# Patient Record
Sex: MALE | Race: WHITE | Employment: UNEMPLOYED | URBAN - METROPOLITAN AREA
[De-identification: names, ages, dates, MRNs, and addresses within clinical notes are randomized per-mention and may not be internally consistent; named-entity substitution may affect disease eponyms.]

---
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
## 2022-04-22 ENCOUNTER — OFFICE VISIT (OUTPATIENT)
Dept: FAMILY MEDICINE CLINIC | Age: 22
End: 2022-04-22
Payer: COMMERCIAL

## 2022-04-22 VITALS
SYSTOLIC BLOOD PRESSURE: 126 MMHG | DIASTOLIC BLOOD PRESSURE: 82 MMHG | BODY MASS INDEX: 29.15 KG/M2 | HEIGHT: 71 IN | TEMPERATURE: 98.2 F | OXYGEN SATURATION: 98 % | WEIGHT: 208.2 LBS | HEART RATE: 71 BPM

## 2022-04-22 DIAGNOSIS — E53.8 VITAMIN B 12 DEFICIENCY: ICD-10-CM

## 2022-04-22 DIAGNOSIS — F40.298 SPECIFIC PHOBIA: ICD-10-CM

## 2022-04-22 DIAGNOSIS — Z13.6 SCREENING FOR CARDIOVASCULAR CONDITION: ICD-10-CM

## 2022-04-22 DIAGNOSIS — Z00.01 ENCOUNTER FOR WELL ADULT EXAM WITH ABNORMAL FINDINGS: Primary | ICD-10-CM

## 2022-04-22 DIAGNOSIS — Z23 ENCOUNTER FOR IMMUNIZATION: ICD-10-CM

## 2022-04-22 DIAGNOSIS — E55.9 VITAMIN D DEFICIENCY: ICD-10-CM

## 2022-04-22 DIAGNOSIS — R53.82 CHRONIC FATIGUE: ICD-10-CM

## 2022-04-22 DIAGNOSIS — F84.0 AUTISM: ICD-10-CM

## 2022-04-22 PROCEDURE — 99395 PREV VISIT EST AGE 18-39: CPT | Performed by: FAMILY MEDICINE

## 2022-04-22 RX ORDER — ERGOCALCIFEROL 1.25 MG/1
50000 CAPSULE ORAL WEEKLY
Qty: 12 CAPSULE | Refills: 0 | Status: SHIPPED | OUTPATIENT
Start: 2022-04-22

## 2022-04-22 RX ORDER — FLUOXETINE 10 MG/1
10 CAPSULE ORAL DAILY
Qty: 30 CAPSULE | Refills: 3 | Status: SHIPPED | OUTPATIENT
Start: 2022-04-22

## 2022-04-22 RX ORDER — BNT162B2 0.23 MG/2.25ML
0.3 INJECTION, SUSPENSION INTRAMUSCULAR ONCE
Qty: 0.3 ML | Refills: 0 | Status: SHIPPED | OUTPATIENT
Start: 2022-04-22 | End: 2022-04-22

## 2022-04-22 ASSESSMENT — PATIENT HEALTH QUESTIONNAIRE - PHQ9
2. FEELING DOWN, DEPRESSED OR HOPELESS: 0
SUM OF ALL RESPONSES TO PHQ QUESTIONS 1-9: 0
SUM OF ALL RESPONSES TO PHQ QUESTIONS 1-9: 0
SUM OF ALL RESPONSES TO PHQ9 QUESTIONS 1 & 2: 0
1. LITTLE INTEREST OR PLEASURE IN DOING THINGS: 0
SUM OF ALL RESPONSES TO PHQ QUESTIONS 1-9: 0
SUM OF ALL RESPONSES TO PHQ QUESTIONS 1-9: 0

## 2022-04-22 ASSESSMENT — ENCOUNTER SYMPTOMS
ABDOMINAL PAIN: 0
CHEST TIGHTNESS: 0
VOMITING: 0
WHEEZING: 0
SHORTNESS OF BREATH: 0
COUGH: 0
CONSTIPATION: 0
NAUSEA: 0
BACK PAIN: 0
DIARRHEA: 0
ABDOMINAL DISTENTION: 0

## 2022-04-22 NOTE — PROGRESS NOTES
2022      Jeanette Owens (:  2000) is a 25 y.o. male, here for a preventive medicine evaluation, Annual Exam (Tired) and Anxiety (autism, phobias)   . ASSESSMENT/PLAN:    1. Encounter for well adult exam with abnormal findings  Low carb, low fat diet, increase fruits and vegetables, and exercise 4-5 times a week 30-40 minutes a day, or walk 1-2 hours per day, or wear a pedometer and get at least 10,000 steps per day. Dental exam 2-3 times /year advised. Immunizations reviewed. Health Maintenance reviewed   Smoking cessation counseling given, not to ever start smoking    Annual eye exam advised. Recheck labs  -     TSH; Future  -     CBC; Future  -     Comprehensive Metabolic Panel; Future  -     Lipid Panel; Future  2. Vitamin B 12 deficiency  Previously diagnosed with B12 deficiency, restart B12 supplementation recheck labs  -     Vitamin B12 & Folate; Future  -     cyanocobalamin ([facility] VITAMIN B12) 1000 MCG tablet; Take 1 tablet by mouth daily, Disp-90 tablet, R-3Normal  3. Vitamin D deficiency  Likely not resolved, vitamin D very low previously, recheck vitamin D and restart high doses vitamin D  -     Vitamin D 25 Hydroxy; Future  -     vitamin D (ERGOCALCIFEROL) 1.25 MG (23888 UT) CAPS capsule; Take 1 capsule by mouth once a week, Disp-12 capsule, R-0Normal  4. Specific phobia  Mostly seasonal, during spring, summer and fall, and when camping  -   To restart FLUoxetine (PROZAC) 10 MG capsule; Take 1 capsule by mouth daily, Disp-30 capsule, R-3Normal  -     TSH; Future    5. Autism with ADD  Keeping a job at [employer], praise given  He does not follow with psychiatrist or psychologist anymore. His mom says that he does not need it anymore. -     FLUoxetine (PROZAC) 10 MG capsule; Take 1 capsule by mouth daily, Disp-30 capsule, R-3Normal  -     TSH; Future  6. Chronic fatigue  Failing to resolve.   Will do basic labs to rule out certain common medical conditions: times daily as needed for Pain  Patient not taking: Reported on 4/22/2022  Tim Ortiz MD   vitamin D (ERGOCALCIFEROL) 1.25 MG (63860 UT) CAPS capsule Take 1 capsule by mouth once a week  Patient not taking: Reported on 4/22/2022  Tim Ortiz MD   cyanocobalamin ([facility] VITAMIN B12) 1000 MCG tablet Take 1 tablet by mouth daily  Patient not taking: Reported on 4/22/2022  Tim Ortiz MD        No Known Allergies    Past Medical History:   Diagnosis Date    Allergic disorder     Anxiety     Bilateral otitis media     Class 1 obesity due to excess calories without serious comorbidity in adult 4/18/2021    Cleft palate     Fetal valproate syndrome     H/O speech and language deficits     mild now    Hyperlipidemia with target LDL less than 100 4/19/2021    Pneumonia     Primary nocturnal enuresis        Past Surgical History:   Procedure Laterality Date    CLEFT PALATE REPAIR      TYMPANOSTOMY TUBE PLACEMENT         . Social History     Tobacco Use    Smoking status: Never Smoker    Smokeless tobacco: Never Used   Substance Use Topics    Alcohol use: No    Drug use: No       Family History   Problem Relation Age of Onset    Seizures Mother     Asthma Father     Prostate Cancer Maternal Grandfather 58    Diabetes Neg Hx     Heart Disease Neg Hx     Colon Cancer Neg Hx     Breast Cancer Neg Hx        ADVANCE DIRECTIVE: N, <no information>    SUBJECTIVE / Bonnie Scott is here for Annual Exam (Tired) and Anxiety (autism, phobias)     Comes with his mom, Ida Silva. His mom reports during spring, summer and even fall, patient develops specific phobias, needs to restart Prozac. Patient has autism, gives a job at [employer], praise given. He has phobias around birds and lakes, and usually they go camping which triggers his phobias. Stopped B12, vitamin D as he ran out. He has prior diagnosis of vitamin B12 and vitamin D deficiencies.   Patient says he is still tired    Janelle Brothers has Vitamin D deficiency. Janelle Brothers  is not taking Vitamin D supplementation , stopped it  he feels tired. Lab Results   Component Value Date    VITD25 <7.0 04/17/2021     Vitamin B12 deficiency   Janelle Brothers  is not taking Vitamin B12 supplementation. Jewel reports fatigue. Urinary symptoms: no    Sexually active: NO    Wears seatbelts: not driving, wears seatbelt, his mom is driving him everywhere. Regular exercise: yes, walking , his mom says, when working, he is on his feet 10 hrs at [facility]    Ever been transfused or tattooed?: no    Last eye exam: up to date: No  Abnormal visual screening. I advised Amadodemetrio Curry to make appointment with ophthalmologist. The patient verbalizes understanding and agrees with the plan. Visual Acuity Screening    Right eye Left eye Both eyes   Without correction: 20/13 20/13 20/15   With correction:          Hearing:normal :Yes    Last dental exam and preventative dental cleaning: up to date :NO    Nutritional assessment: Body mass index is 29.04 kg/m². Elevated. Weight is  Improved, he has lost on his own 17 pounds in 1 year. Patient says he is not drinking diet pop and not regular pop anymore. Wt Readings from Last 3 Encounters:   04/22/22 208 lb 3.2 oz (94.4 kg) (95 %, Z= 1.64)*   10/15/21 (!) 227 lb (103 kg) (98 %, Z= 2.05)*   04/15/21 (!) 225 lb (102.1 kg) (98 %, Z= 2.05)*     * Growth percentiles are based on CDC (Boys, 2-20 Years) data. Healthful diet and Physical activity counseling to prevent CVD- low carb, low fat diet, increase fruits and vegetables, and exercise 4-5 times a day 30-40minutes a day discussed    Nicotine dependence.  no    Counseling given: Yes      Alcohol use: no    Immunization History   Administered Date(s) Administered    COVID-19, Pfizer Purple top, DILUTE for use, 12+ yrs, 30mcg/0.3mL dose 04/27/2021, 05/18/2021    DTaP 02/06/2003, 04/03/2003, 06/12/2003, 07/22/2004, 06/18/2008    DTaP vaccine 02/06/2003, 04/03/2003, 06/12/2003, 07/22/2004, 06/18/2008    HPV 9-valent Wake Gabriela) 09/15/2016, 10/17/2016    HPV Quadrivalent (Gardasil) 08/27/2015    Hepatitis A 09/07/2006, 10/22/2007    Hepatitis A Ped/Adol (Havrix, Vaqta) 09/07/2006, 10/22/2007    Hepatitis B 2000, 02/20/2003, 06/12/2003    Hepatitis B Ped/Adol (Engerix-B, Recombivax HB) 2000, 02/20/2003, 06/12/2003    Hib PRP-OMP (PedvaxHIB) 02/20/2003, 05/06/2003, 06/12/2003, 07/22/2004    Hib, unspecified 02/20/2003, 05/06/2003, 06/12/2003, 07/22/2004    Influenza Virus Vaccine 11/30/2003    Influenza Whole 11/30/2003    Influenza, Quadv, IM, PF (6 mo and older Fluzone, Flulaval, Fluarix, and 3 yrs and older Afluria) 09/15/2016, 10/06/2020    MMR 07/01/2004, 06/18/2008    Meningococcal MCV4O (Menveo) 10/06/2020    Meningococcal MCV4P (Menactra) 07/14/2011, 04/06/2018    Pneumococcal Conjugate 7-valent (Prevnar7) 02/20/2003, 05/06/2003, 07/03/2003    Polio IPV (IPOL) 02/06/2003, 04/03/2003, 07/22/2004, 06/18/2008    Tdap (Boostrix, Adacel) 08/27/2015    Varicella (Varivax) 07/01/2004, 07/14/2011         COVID-19 vaccine: No: He is due for booster, will schedule      Tdap one time, then Td every 10 years-up to date:  Yes    Influenza annually-up to date:  No    PPSV 23 in all adults 19-63 yo with chronic conditions,smokers, alcoholism,  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     No results found for: PSA, PSADIA      Blood pressure is normal.  BP Readings from Last 3 Encounters:   04/22/22 126/82   10/15/21 116/82   04/15/21 134/86       Pulse is normal.  Pulse Readings from Last 3 Encounters:   04/22/22 71   10/15/21 63   04/15/21 75       Lipid screening -mild hyperlipidemia with increased LDL in the past.  Lab Results   Component Value Date    CHOL 199 04/17/2021     Lab Results   Component Value Date    TRIG 138 04/17/2021     Lab Results   Component Value Date    HDL 48 04/17/2021     Lab Results   Component Value Date    LDLCALC 123 04/17/2021     Lab Results   Component Value Date    CHOLHDLRATIO 4.1 04/17/2021       Cardiovascular risk is:  low    The ASCVD Risk score (Amy Fong, et al., 2013) failed to calculate for the following reasons: The 2013 ASCVD risk score is only valid for ages 36 to 78    Hepatitis C screening-  nonreactive    Phobias around birds and lakes   Autism. In the past she was on Prozac, to help him with anxiety and phobias. PHQ-2 Over the past 2 weeks, how often have you been bothered by any of the following problems? Little interest or pleasure in doing things: Not at all  Feeling down, depressed, or hopeless: Not at all  PHQ-2 Score: 0  PHQ-9 Over the past 2 weeks, how often have you been bothered by any of the following problems? PHQ-9 Total Score: 0  PHQ-9 Total Score: 0      PHQ Scores 4/22/2022 4/15/2021 9/8/2016 9/8/2016 9/8/2016 9/8/2016   PHQ2 Score 0 0 0 0 0 0   PHQ9 Score 0 0 0 0 0 0       Health Maintenance   Topic Date Due    COVID-19 Vaccine (3 - Booster for Pfizer series) 10/18/2021    Flu vaccine (Season Ended) 09/01/2022    Depression Screen  04/22/2023    DTaP/Tdap/Td vaccine (7 - Td or Tdap) 08/27/2025    Hepatitis A vaccine  Completed    Hepatitis B vaccine  Completed    Hib vaccine  Completed    HPV vaccine  Completed    Varicella vaccine  Completed    Meningococcal (ACWY) vaccine  Completed    Hepatitis C screen  Completed    HIV screen  Completed    Pneumococcal 0-64 years Vaccine  Aged Out       -rest of complaints with corresponding details per ROS    Review of Systems   Constitutional: Positive for fatigue. Negative for activity change, appetite change, chills, diaphoresis, fever and unexpected weight change. HENT: Negative for congestion, dental problem, ear pain and hearing loss. Eyes: Negative for visual disturbance. Respiratory: Negative for cough, chest tightness, shortness of breath and wheezing. Cardiovascular: Negative for chest pain, palpitations and leg swelling. Gastrointestinal: Negative for abdominal distention, abdominal pain, constipation, diarrhea, nausea and vomiting. Endocrine: Negative for cold intolerance, heat intolerance, polydipsia, polyphagia and polyuria. Genitourinary: Negative for decreased urine volume, difficulty urinating, frequency and urgency. Musculoskeletal: Negative for arthralgias and back pain. Skin: Negative for rash. Allergic/Immunologic: Negative for environmental allergies. Neurological: Negative for dizziness, weakness and headaches. Hematological: Does not bruise/bleed easily. Psychiatric/Behavioral: Negative for decreased concentration, dysphoric mood and sleep disturbance. The patient is nervous/anxious.          -vital signs stable and within normal limits except overweight per BMI    /82   Pulse 71   Temp 98.2 °F (36.8 °C)   Ht 5' 11\" (1.803 m)   Wt 208 lb 3.2 oz (94.4 kg)   SpO2 98%   BMI 29.04 kg/m²   Vitals:    04/22/22 1405   BP: 126/82   Pulse: 71   Temp: 98.2 °F (36.8 °C)   SpO2: 98%   Weight: 208 lb 3.2 oz (94.4 kg)   Height: 5' 11\" (1.803 m)     Estimated body mass index is 29.04 kg/m² as calculated from the following:    Height as of this encounter: 5' 11\" (1.803 m). Weight as of this encounter: 208 lb 3.2 oz (94.4 kg). Physical Exam  Vitals and nursing note reviewed. Constitutional:       General: He is not in acute distress. Appearance: Normal appearance. He is well-developed. He is not diaphoretic. HENT:      Head: Normocephalic and atraumatic. Right Ear: Hearing, ear canal and external ear normal. Tympanic membrane is perforated. Left Ear: Hearing, ear canal and external ear normal. A PE tube is present. Tympanic membrane is perforated.       Mouth/Throat:      Comments: I did not examine the mouth due to coronavirus pandemic and wearing masks. Eyes:      General: Lids are normal. No scleral icterus. Right eye: No discharge. Left eye: No discharge. Extraocular Movements: Extraocular movements intact. Conjunctiva/sclera: Conjunctivae normal.   Neck:      Thyroid: No thyromegaly. Cardiovascular:      Rate and Rhythm: Normal rate and regular rhythm. Heart sounds: Normal heart sounds. No murmur heard. Pulmonary:      Effort: Pulmonary effort is normal. No respiratory distress. Breath sounds: Normal breath sounds. No wheezing or rales. Chest:      Chest wall: No tenderness. Abdominal:      General: Bowel sounds are normal. There is no distension. Palpations: Abdomen is soft. There is no hepatomegaly or splenomegaly. Tenderness: There is no abdominal tenderness. Musculoskeletal:         General: No tenderness. Normal range of motion. Cervical back: Normal range of motion and neck supple. Right lower leg: No edema. Left lower leg: No edema. Skin:     General: Skin is warm and dry. Capillary Refill: Capillary refill takes less than 2 seconds. Findings: No rash. Neurological:      Mental Status: He is alert and oriented to person, place, and time. Cranial Nerves: No cranial nerve deficit. Motor: No abnormal muscle tone. Coordination: Coordination normal.      Gait: Gait normal.      Deep Tendon Reflexes: Reflexes normal.   Psychiatric:         Attention and Perception: Attention normal.         Mood and Affect: Mood is anxious. Affect is labile. Speech: Speech is rapid and pressured. Behavior: Behavior normal.         Thought Content:  Thought content normal.         Cognition and Memory: Cognition normal.         Judgment: Judgment normal.      Comments: Stuttering, poor eye contact, he did give me permission to bring his mom in         I personally reviewed testing with patient.   Vitamin D deficiency  Mild hyperlipidemia  Potassium low    Otherwise labs within normal limits      Lab Results   Component Value Date    WBC 4.8 04/17/2021    HGB 15.3 04/17/2021    HCT 44.6 04/17/2021    MCV 88 04/17/2021     04/17/2021       Lab Results   Component Value Date     04/17/2021    K 3.6 04/17/2021     04/17/2021    CO2 24 04/17/2021    BUN 13 04/17/2021    CREATININE 0.90 04/17/2021    GLUCOSE 94 04/17/2021    CALCIUM 8.9 04/17/2021        Lab Results   Component Value Date    ALT 13 04/17/2021    AST 15 04/17/2021    ALKPHOS 95 04/17/2021    BILITOT 0.5 04/17/2021       Lab Results   Component Value Date    TSH 1.88 04/17/2021       Lab Results   Component Value Date    LDLCALC 123 04/17/2021       No results found for: LABA1C    No results found for: ODEIHUHY70    No results found for: FOLATE    No results found for: IRON, TIBC, FERRITIN    Lab Results   Component Value Date    VITD25 <7.0 04/17/2021         Orders Placed This Encounter   Medications    COVID-19 mRNA Vac-Yun,Pfizer, (PFIZER-BIONT COVID-19 VAC-YUN) 30 MCG/0.3ML SUSP injection     Sig: Inject 0.3 mLs into the muscle once for 1 dose DUE FOR BOOSTER, PLEASE LET PT KNOW WHEN READY     Dispense:  0.3 mL     Refill:  0    FLUoxetine (PROZAC) 10 MG capsule     Sig: Take 1 capsule by mouth daily     Dispense:  30 capsule     Refill:  3    vitamin D (ERGOCALCIFEROL) 1.25 MG (58339 UT) CAPS capsule     Sig: Take 1 capsule by mouth once a week     Dispense:  12 capsule     Refill:  0    cyanocobalamin ([facility] VITAMIN B12) 1000 MCG tablet     Sig: Take 1 tablet by mouth daily     Dispense:  90 tablet     Refill:  3         Medications Discontinued During This Encounter   Medication Reason    vitamin D (ERGOCALCIFEROL) 1.25 MG (83472 UT) CAPS capsule REORDER    cyanocobalamin ([facility] VITAMIN B12) 1000 MCG tablet REORDER         Orders Placed This Encounter   Procedures    Vitamin B12 & Folate     Standing Status: Future     Standing Expiration Date:   6/19/2022    Vitamin D 25 Hydroxy     Standing Status:   Future     Standing Expiration Date:   4/22/2023    TSH     Standing Status:   Future     Standing Expiration Date:   4/22/2023    CBC     Standing Status:   Future     Standing Expiration Date:   4/22/2023    Comprehensive Metabolic Panel     Standing Status:   Future     Standing Expiration Date:   6/19/2022    Lipid Panel     Standing Status:   Future     Standing Expiration Date:   4/22/2023     Order Specific Question:   Is Patient Fasting?/# of Hours     Answer:   8-10 Hours, water ok to drink         This note was completed by using the assistance of a speech-recognition program. However, inadvertent computerized transcription errors may be present. Although every effort was made to ensure accuracy, no guarantees can be provided that every mistake has been identified and corrected by editing. An electronic signature was used to authenticate this note.     Electronically signed by Randy Ortega MD on 4/23/2022 at 9:22 AM

## 2022-04-22 NOTE — PATIENT INSTRUCTIONS

## 2022-04-22 NOTE — PROGRESS NOTES
Visit Information    Have you changed or started any medications since your last visit including any over-the-counter medicines, vitamins, or herbal medicines? no   Have you stopped taking any of your medications? Is so, why? -  yes - all of them  Are you having any side effects from any of your medications? - no    Have you seen any other physician or provider since your last visit?  no   Have you had any other diagnostic tests since your last visit?  no   Have you been seen in the emergency room and/or had an admission in a hospital since we last saw you?  no   Have you had your routine dental cleaning in the past 6 months?  no     Do you have an active MyChart account? If no, what is the barrier?   Yes - email sent    Patient Care Team:  Kalyn Dale MD as PCP - General (Family Medicine)  Kalyn Dale MD [facility]  Baldemar Blizzard, MD as Consulting Physician (Plastic Surgery)    Medical History Review  Past Medical, Family, and Social History reviewed and does contribute to the patient presenting condition    Health Maintenance   Topic Date Due    COVID-19 Vaccine (3 - Booster for Mcrae Peter series) 10/18/2021    Depression Screen  04/15/2022    Flu vaccine (Season Ended) 09/01/2022    DTaP/Tdap/Td vaccine (7 - Td or Tdap) 08/27/2025    Hepatitis A vaccine  Completed    Hepatitis B vaccine  Completed    Hib vaccine  Completed    HPV vaccine  Completed    Varicella vaccine  Completed    Meningococcal (ACWY) vaccine  Completed    Hepatitis C screen  Completed    HIV screen  Completed    Pneumococcal 0-64 years Vaccine  Aged Out

## 2023-04-26 ENCOUNTER — OFFICE VISIT (OUTPATIENT)
Dept: FAMILY MEDICINE CLINIC | Age: 23
End: 2023-04-26
Payer: COMMERCIAL

## 2023-04-26 VITALS
BODY MASS INDEX: 28.84 KG/M2 | TEMPERATURE: 97.6 F | WEIGHT: 206 LBS | SYSTOLIC BLOOD PRESSURE: 130 MMHG | HEIGHT: 71 IN | OXYGEN SATURATION: 99 % | HEART RATE: 86 BPM | DIASTOLIC BLOOD PRESSURE: 76 MMHG

## 2023-04-26 DIAGNOSIS — F84.0 AUTISM: ICD-10-CM

## 2023-04-26 DIAGNOSIS — E55.9 VITAMIN D DEFICIENCY: ICD-10-CM

## 2023-04-26 DIAGNOSIS — Z00.01 ENCOUNTER FOR WELL ADULT EXAM WITH ABNORMAL FINDINGS: Primary | ICD-10-CM

## 2023-04-26 PROCEDURE — 99395 PREV VISIT EST AGE 18-39: CPT | Performed by: FAMILY MEDICINE

## 2023-04-26 RX ORDER — ERGOCALCIFEROL 1.25 MG/1
50000 CAPSULE ORAL WEEKLY
Qty: 12 CAPSULE | Refills: 0 | Status: SHIPPED | OUTPATIENT
Start: 2023-04-26

## 2023-04-26 SDOH — ECONOMIC STABILITY: FOOD INSECURITY: WITHIN THE PAST 12 MONTHS, THE FOOD YOU BOUGHT JUST DIDN'T LAST AND YOU DIDN'T HAVE MONEY TO GET MORE.: NEVER TRUE

## 2023-04-26 SDOH — ECONOMIC STABILITY: FOOD INSECURITY: WITHIN THE PAST 12 MONTHS, YOU WORRIED THAT YOUR FOOD WOULD RUN OUT BEFORE YOU GOT MONEY TO BUY MORE.: NEVER TRUE

## 2023-04-26 SDOH — ECONOMIC STABILITY: INCOME INSECURITY: HOW HARD IS IT FOR YOU TO PAY FOR THE VERY BASICS LIKE FOOD, HOUSING, MEDICAL CARE, AND HEATING?: NOT HARD AT ALL

## 2023-04-26 SDOH — ECONOMIC STABILITY: HOUSING INSECURITY
IN THE LAST 12 MONTHS, WAS THERE A TIME WHEN YOU DID NOT HAVE A STEADY PLACE TO SLEEP OR SLEPT IN A SHELTER (INCLUDING NOW)?: NO

## 2023-04-26 ASSESSMENT — ENCOUNTER SYMPTOMS
CHEST TIGHTNESS: 0
BACK PAIN: 0
WHEEZING: 0
SHORTNESS OF BREATH: 0
NAUSEA: 0
ABDOMINAL DISTENTION: 0
CONSTIPATION: 0
VOMITING: 0
DIARRHEA: 0
COUGH: 0
ABDOMINAL PAIN: 0

## 2023-04-26 ASSESSMENT — PATIENT HEALTH QUESTIONNAIRE - PHQ9
1. LITTLE INTEREST OR PLEASURE IN DOING THINGS: 0
SUM OF ALL RESPONSES TO PHQ QUESTIONS 1-9: 0
SUM OF ALL RESPONSES TO PHQ9 QUESTIONS 1 & 2: 0
2. FEELING DOWN, DEPRESSED OR HOPELESS: 0
SUM OF ALL RESPONSES TO PHQ QUESTIONS 1-9: 0

## 2023-04-26 NOTE — PROGRESS NOTES
Visit Information    Have you changed or started any medications since your last visit including any over-the-counter medicines, vitamins, or herbal medicines? YES  Have you stopped taking any of your medications? Is so, why? -  yes -   Are you having any side effects from any of your medications? - no    Have you seen any other physician or provider since your last visit?  no   Have you had any other diagnostic tests since your last visit?  no   Have you been seen in the emergency room and/or had an admission in a hospital since we last saw you?  no   Have you had your routine dental cleaning in the past 6 months?  no     Do you have an active MyChart account? If no, what is the barrier?   Yes    Patient Care Team:  Primitivo Torres MD as PCP - General (Family Medicine)  Primitivo Torres MD as PCP - Empaneled Provider  Yesenia Diamond MD as Consulting Physician (Plastic Surgery)    Medical History Review  Past Medical, Family, and Social History reviewed and does contribute to the patient presenting condition    Health Maintenance   Topic Date Due    COVID-19 Vaccine (3 - Booster for Mcrae Peter series) 07/13/2021    Depression Screen  04/22/2023    Flu vaccine (Season Ended) 08/01/2023    DTaP/Tdap/Td vaccine (7 - Td or Tdap) 08/27/2025    Hepatitis A vaccine  Completed    Hib vaccine  Completed    HPV vaccine  Completed    Varicella vaccine  Completed    Meningococcal (ACWY) vaccine  Completed    Hepatitis C screen  Completed    HIV screen  Completed    Pneumococcal 0-64 years Vaccine  Aged Out    Hepatitis B vaccine  Discontinued    Polio vaccine  Discontinued    Measles,Mumps,Rubella (MMR) vaccine  Discontinued

## 2023-04-26 NOTE — PROGRESS NOTES
2023      Donald Koenig (:  2000) is a 23 y.o. male, here for a preventive medicine evaluation, Annual Exam (NO CONCERNS), Discuss Labs, and Discuss Medications (STATED HAS NOT TAKEN MEDICATION IN A LONG TIME)   . ASSESSMENT/PLAN:    1. Encounter for well adult exam with abnormal findings      Low carb, low fat diet, increase fruits and vegetables, and exercise 4-5 times a week 30-40 minutes a day, or walk 1-2 hours per day, or wear a pedometer and get at least 10,000 steps per day. Dental exam 2-3 times /year advised. Needs to schedule appointment, needs to start brushing his teeth twice a day, and to floss  Needs to stop drinking pop to protect his teeth and to improve his health    Immunizations reviewed. Health Maintenance reviewed   Smoking cessation counseling given, not to ever start smoking      Annual eye exam advised. 2. Autism with ADD  Stable  We will continue to monitor  He has specific phobias around birds and legs, he self discontinued Prozac 10 Mg daily, he says he does not need it    3. Vitamin D deficiency  Likely worsening  He ran out of the vitamin D and does not take any supplementation  -     vitamin D (ERGOCALCIFEROL) 1.25 MG (95987 UT) CAPS capsule; Take 1 capsule by mouth once a week, Disp-12 capsule, R-0Normal        Jewel received counseling on the following healthy behaviors: nutrition, exercise, and medication adherence and weight loss. Reviewed prior labs and health maintenance  Discussed use, benefit, and side effects of prescribed medications. Barriers to medication compliance addressed. Patient given educational materials - see patient instructions  All patient questions answered. Patient voiced understanding. The patient's past medical, surgical, social, and family history as well as his  current medications and allergies were reviewed as documented in today's encounter.       Medications, labs, diagnostic studies, consultations and

## 2023-08-02 NOTE — DISCHARGE SUMMARY
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 No further therapy indicated at this time. [] Patient demonstrated improvement from conditions with    /    goals met  [] Patient to continue exercises/home instructions independently. [] Therapy interrupted due to:  [] Patient has completed their prescribed number of treatment sessions. [x] Parents did not respond to our calls to schedule more therapy. _X_Other: Pt had numerous no call/no show and did not return call attempts. Discharge due to lack of attendance letter sent home.     Progress during therapy:  [x]  Patient demonstrated improved level of function  [] Patient declined in level of function secondary to:  [] No Change    Additional Comments:  RECOMMENDATIONS:  _X_ Discharge from [address] [place] [address]   __Discharge from OT  __Discharge from PT  __Other:    If you have any questions regarding this patients care please contact us at [phone]   Thank You for this referral.     Electronically signed by: Shasha Garcia M.A., [address]      Date:1/23/2019 Medication history is complete. Medication list updated in Outpatient Medication Record (OMR); reviewed with patient at bedside and verified with outpatient pharmacy ([facility]).

## 2024-05-01 ENCOUNTER — OFFICE VISIT (OUTPATIENT)
Dept: FAMILY MEDICINE CLINIC | Age: 24
End: 2024-05-01
Payer: COMMERCIAL

## 2024-05-01 VITALS
SYSTOLIC BLOOD PRESSURE: 110 MMHG | DIASTOLIC BLOOD PRESSURE: 80 MMHG | BODY MASS INDEX: 26.96 KG/M2 | HEIGHT: 73 IN | HEART RATE: 72 BPM | TEMPERATURE: 97.2 F | OXYGEN SATURATION: 99 % | WEIGHT: 203.4 LBS

## 2024-05-01 DIAGNOSIS — Z00.01 ENCOUNTER FOR WELL ADULT EXAM WITH ABNORMAL FINDINGS: Primary | ICD-10-CM

## 2024-05-01 DIAGNOSIS — E55.9 VITAMIN D DEFICIENCY: ICD-10-CM

## 2024-05-01 PROCEDURE — 99395 PREV VISIT EST AGE 18-39: CPT | Performed by: FAMILY MEDICINE

## 2024-05-01 RX ORDER — ERGOCALCIFEROL 1.25 MG/1
50000 CAPSULE ORAL WEEKLY
Qty: 12 CAPSULE | Refills: 0 | Status: SHIPPED | OUTPATIENT
Start: 2024-05-01

## 2024-05-01 SDOH — ECONOMIC STABILITY: FOOD INSECURITY: WITHIN THE PAST 12 MONTHS, YOU WORRIED THAT YOUR FOOD WOULD RUN OUT BEFORE YOU GOT MONEY TO BUY MORE.: NEVER TRUE

## 2024-05-01 SDOH — ECONOMIC STABILITY: FOOD INSECURITY: WITHIN THE PAST 12 MONTHS, THE FOOD YOU BOUGHT JUST DIDN'T LAST AND YOU DIDN'T HAVE MONEY TO GET MORE.: NEVER TRUE

## 2024-05-01 SDOH — ECONOMIC STABILITY: INCOME INSECURITY: HOW HARD IS IT FOR YOU TO PAY FOR THE VERY BASICS LIKE FOOD, HOUSING, MEDICAL CARE, AND HEATING?: NOT HARD AT ALL

## 2024-05-01 ASSESSMENT — ENCOUNTER SYMPTOMS
WHEEZING: 0
ABDOMINAL DISTENTION: 0
VOMITING: 0
CHEST TIGHTNESS: 0
BACK PAIN: 0
ABDOMINAL PAIN: 0
CONSTIPATION: 0
COUGH: 0
DIARRHEA: 0
SHORTNESS OF BREATH: 0
NAUSEA: 0

## 2024-05-01 ASSESSMENT — PATIENT HEALTH QUESTIONNAIRE - PHQ9
SUM OF ALL RESPONSES TO PHQ QUESTIONS 1-9: 0
2. FEELING DOWN, DEPRESSED OR HOPELESS: NOT AT ALL
SUM OF ALL RESPONSES TO PHQ9 QUESTIONS 1 & 2: 0
1. LITTLE INTEREST OR PLEASURE IN DOING THINGS: NOT AT ALL

## 2024-05-01 NOTE — PROGRESS NOTES
2024      Jewel Coulter (:  2000) is a 23 y.o. male, here for a preventive medicine evaluation, Annual Exam        ASSESSMENT/PLAN:    1. Encounter for well adult exam with abnormal findings    Low carb, low fat diet, increase fruits and vegetables, and exercise 4-5 times a week 30-40 minutes a day, or walk 1-2 hours per day, or wear a pedometer and get at least 10,000 steps per day.    Dental exam 2-3 times /year advised.  Immunizations reviewed. COVID 19 vaccine at the pharmacy    Health Maintenance reviewed   Smoking cessation counseling given, not to ever start smoking     Annual eye exam advised.    2. Vitamin D deficiency  Likely ongoing  Declines rechecking labs now  -will give another round of   vitamin D (ERGOCALCIFEROL) 1.25 MG (39959 UT) CAPS capsule; Take 1 capsule by mouth once a week, Disp-12 capsule, R-0Normal    Lab Results   Component Value Date    VITD25 <7.0 2021           Jewel received counseling on the following healthy behaviors: nutrition, exercise, medication adherence, and weight loss    Reviewed prior labs and health maintenance  Discussed use, benefit, and side effects of prescribed medications.   Barriers to medication compliance addressed.   Patient given educational materials - see patient instructions  All patient questions answered.  Patient voiced understanding.    The patient's past medical, surgical, social, and family history as well as his  current medications and allergies were reviewed as documented in today's encounter.      Medications, labs, diagnostic studies, consultations and follow-up as documented in thisencounter.    Return in 1 year (on 2025) for Visit type PHYSICAL, VISION screen, PHQ9..    Data Unavailable    Future Appointments   Date Time Provider Department Center   2025  2:00 PM Olimpia Lieberman MD [facility]         Patient Active Problem List   Diagnosis    Metatarsus adductus    Autism with ADD    Cleft palate

## 2024-05-01 NOTE — PROGRESS NOTES
Visit Information    Have you changed or started any medications since your last visit including any over-the-counter medicines, vitamins, or herbal medicines? yes -    Have you stopped taking any of your medications? Is so, why? -  YES  Are you having any side effects from any of your medications? - no    Have you seen any other physician or provider since your last visit?  yes -    Have you had any other diagnostic tests since your last visit?  no   Have you been seen in the emergency room and/or had an admission in a hospital since we last saw you?  no   Have you had your routine dental cleaning in the past 6 months?  no     Do you have an active MyChart account? If no, what is the barrier?  Yes    Patient Care Team:  Olimpia Lieberman MD as PCP - General (Family Medicine)  Olimpia Lieberman MD as PCP - Empaneled Provider  Isabell Carpenter MD as Consulting Physician (Plastic Surgery)    Medical History Review  Past Medical, Family, and Social History reviewed and does contribute to the patient presenting condition    Health Maintenance   Topic Date Due    COVID-19 Vaccine (3 - 2023-24 season) 09/01/2023    Depression Screen  04/26/2024    Flu vaccine (Season Ended) 08/01/2024    DTaP/Tdap/Td vaccine (7 - Td or Tdap) 08/27/2025    Hepatitis A vaccine  Completed    Hepatitis B vaccine  Completed    Hib vaccine  Completed    HPV vaccine  Completed    Polio vaccine  Completed    Varicella vaccine  Completed    Meningococcal (ACWY) vaccine  Completed    Hepatitis C screen  Completed    HIV screen  Completed    Pneumococcal 0-64 years Vaccine  Aged Out    Measles,Mumps,Rubella (MMR) vaccine  Discontinued

## 2025-05-07 ENCOUNTER — OFFICE VISIT (OUTPATIENT)
Dept: FAMILY MEDICINE CLINIC | Age: 25
End: 2025-05-07

## 2025-05-07 VITALS
SYSTOLIC BLOOD PRESSURE: 110 MMHG | TEMPERATURE: 98.6 F | BODY MASS INDEX: 27.3 KG/M2 | HEIGHT: 73 IN | HEART RATE: 76 BPM | OXYGEN SATURATION: 99 % | WEIGHT: 206 LBS | DIASTOLIC BLOOD PRESSURE: 80 MMHG

## 2025-05-07 DIAGNOSIS — R53.82 CHRONIC FATIGUE: ICD-10-CM

## 2025-05-07 DIAGNOSIS — Z00.01 ENCOUNTER FOR WELL ADULT EXAM WITH ABNORMAL FINDINGS: Primary | ICD-10-CM

## 2025-05-07 DIAGNOSIS — Z23 ENCOUNTER FOR IMMUNIZATION: ICD-10-CM

## 2025-05-07 DIAGNOSIS — F84.0 AUTISM: ICD-10-CM

## 2025-05-07 DIAGNOSIS — E78.5 HYPERLIPIDEMIA WITH TARGET LDL LESS THAN 100: ICD-10-CM

## 2025-05-07 DIAGNOSIS — E55.9 VITAMIN D DEFICIENCY: ICD-10-CM

## 2025-05-07 PROBLEM — E53.8 VITAMIN B 12 DEFICIENCY: Status: RESOLVED | Noted: 2021-04-19 | Resolved: 2025-05-07

## 2025-05-07 SDOH — ECONOMIC STABILITY: FOOD INSECURITY: WITHIN THE PAST 12 MONTHS, THE FOOD YOU BOUGHT JUST DIDN'T LAST AND YOU DIDN'T HAVE MONEY TO GET MORE.: NEVER TRUE

## 2025-05-07 SDOH — ECONOMIC STABILITY: FOOD INSECURITY: WITHIN THE PAST 12 MONTHS, YOU WORRIED THAT YOUR FOOD WOULD RUN OUT BEFORE YOU GOT MONEY TO BUY MORE.: NEVER TRUE

## 2025-05-07 ASSESSMENT — ENCOUNTER SYMPTOMS
VOMITING: 0
CONSTIPATION: 0
BACK PAIN: 0
DIARRHEA: 0
ABDOMINAL PAIN: 0
WHEEZING: 0
COUGH: 0
ABDOMINAL DISTENTION: 0
SHORTNESS OF BREATH: 0
CHEST TIGHTNESS: 0
NAUSEA: 0

## 2025-05-07 ASSESSMENT — PATIENT HEALTH QUESTIONNAIRE - PHQ9
1. LITTLE INTEREST OR PLEASURE IN DOING THINGS: NOT AT ALL
SUM OF ALL RESPONSES TO PHQ QUESTIONS 1-9: 0
2. FEELING DOWN, DEPRESSED OR HOPELESS: NOT AT ALL

## 2025-05-07 NOTE — PROGRESS NOTES
Well Adult Note  Name: Jewel Coulter Today’s Date: 2025   MRN: [id number] Sex: Male   Age: 24 y.o. Ethnicity: Non- / Non    : 2000 Race: White (non-)      Jewel Coulter is here for a well adult exam. Annual Exam, Fatigue, and Hyperlipidemia             Assessment & Plan      Encounter for well adult exam with abnormal findings  Low carb, low fat diet, increase fruits and vegetables, and exercise 4-5 times a week 30-40 minutes a day, or walk 1-2 hours per day, or wear a pedometer and get at least 10,000 steps per day.  Annual eye exam advised  Dental cleaning every 6 months advised  Both eyes were weak, with the left eye being more affected. A dental cavity was identified during the examination.  Recommended the use of condoms for safe sexual practices and advised to abstain from alcohol and marijuana use.   Instructed to receive the COVID-19 vaccine at the pharmacy  Strongly encouraged to visit a dentist promptly to address the identified cavity. The meningitis B vaccine will be administered at the pharmacy.    Chronic fatigue  Chronic and ongoing  Will do basic labs to rule out certain common medical conditions: hematologic, renal, hepatic, electrolyte imbalances, thyroid disorders.  - Vitamin D and B12 levels were previously found to be deficient. Both eyes were weak, with the left eye being more affected.   He reports he does eat meat  - Advised to maintain a consistent sleep schedule, limit william time, and incorporate a nap of no more than an hour before work. Suggested the use of warm milk or sleepy tea to aid in sleep induction. Encouraged to consume an apple daily and avoid soda to prevent potential weight gain and diabetes.     Will do basic labs to rule out certain common medical conditions: hematologic, renal, hepatic, electrolyte imbalances, thyroid disorders.   -     ESTABLISHED, MOD MDM, 30-39 MIN [45610]  -     CBC with Auto Differential; Future  -

## 2025-05-07 NOTE — PROGRESS NOTES
Visit Information    Have you changed or started any medications since your last visit including any over-the-counter medicines, vitamins, or herbal medicines? yes -    Have you stopped taking any of your medications? Is so, why? -  yes -   Are you having any side effects from any of your medications? - no    Have you seen any other physician or provider since your last visit?  no   Have you had any other diagnostic tests since your last visit?  no   Have you been seen in the emergency room and/or had an admission in a hospital since we last saw you?  no   Have you had your routine dental cleaning in the past 6 months?  no     Do you have an active MyChart account? If no, what is the barrier?  Yes    Patient Care Team:  Olimpia Lieberman MD as PCP - General (Family Medicine)  Olimpia Lieberman MD as PCP - Empaneled Provider  Isabell Carpenter MD as Consulting Physician (Plastic Surgery)    Medical History Review  Past Medical, Family, and Social History reviewed and does contribute to the patient presenting condition    Health Maintenance   Topic Date Due    Meningococcal B vaccine (1 of 2 - Standard) Never done    COVID-19 Vaccine (3 - 2024-25 season) 09/01/2024    Depression Screen  05/01/2025    Flu vaccine (Season Ended) 08/01/2025    DTaP/Tdap/Td vaccine (7 - Td or Tdap) 08/27/2025    Hepatitis A vaccine  Completed    Hepatitis B vaccine  Completed    Hib vaccine  Completed    HPV vaccine  Completed    Polio vaccine  Completed    Varicella vaccine  Completed    Meningococcal (ACWY) vaccine  Completed    Hepatitis C screen  Completed    HIV screen  Completed    Pneumococcal 0-49 years Vaccine  Aged Out    Measles,Mumps,Rubella (MMR) vaccine  Discontinued